# Patient Record
Sex: MALE
[De-identification: names, ages, dates, MRNs, and addresses within clinical notes are randomized per-mention and may not be internally consistent; named-entity substitution may affect disease eponyms.]

---

## 2023-06-28 PROBLEM — Z00.00 ENCOUNTER FOR PREVENTIVE HEALTH EXAMINATION: Status: ACTIVE | Noted: 2023-06-28

## 2023-06-30 ENCOUNTER — NON-APPOINTMENT (OUTPATIENT)
Age: 53
End: 2023-06-30

## 2023-06-30 ENCOUNTER — APPOINTMENT (OUTPATIENT)
Dept: COLORECTAL SURGERY | Facility: CLINIC | Age: 53
End: 2023-06-30
Payer: COMMERCIAL

## 2023-06-30 VITALS
BODY MASS INDEX: 40.43 KG/M2 | HEART RATE: 100 BPM | DIASTOLIC BLOOD PRESSURE: 89 MMHG | WEIGHT: 315 LBS | OXYGEN SATURATION: 96 % | HEIGHT: 74 IN | SYSTOLIC BLOOD PRESSURE: 122 MMHG | TEMPERATURE: 98.8 F

## 2023-06-30 DIAGNOSIS — Z85.118 PERSONAL HISTORY OF OTHER MALIGNANT NEOPLASM OF BRONCHUS AND LUNG: ICD-10-CM

## 2023-06-30 DIAGNOSIS — C18.9 MALIGNANT NEOPLASM OF COLON, UNSPECIFIED: ICD-10-CM

## 2023-06-30 DIAGNOSIS — Z87.891 PERSONAL HISTORY OF NICOTINE DEPENDENCE: ICD-10-CM

## 2023-06-30 PROCEDURE — 99203 OFFICE O/P NEW LOW 30 MIN: CPT | Mod: 25

## 2023-06-30 PROCEDURE — 46600 DIAGNOSTIC ANOSCOPY SPX: CPT

## 2023-06-30 NOTE — REVIEW OF SYSTEMS
[Negative] : Heme/Lymph [Fever] : no fever [Chills] : no chills [Feeling Poorly] : not feeling poorly [Feeling Tired] : not feeling tired [Recent Weight Gain (___ Lbs)] : no recent weight gain [Recent Weight Loss (___ Lbs)] : no recent weight loss [Eye Pain] : no eye pain [Red Eyes] : eyes not red [Earache] : no earache [Loss Of Hearing] : no hearing loss [Nasal Discharge] : no nasal discharge [Nosebleeds] : no nosebleeds [Heart Rate Is Slow] : the heart rate was not slow [Heart Rate Is Fast] : the heart rate was not fast [Shortness Of Breath] : no shortness of breath [Wheezing] : no wheezing [Cough] : no cough [SOB on Exertion] : no shortness of breath during exertion [Abdominal Pain] : no abdominal pain [Vomiting] : no vomiting [Constipation] : no constipation [Diarrhea] : no diarrhea [Heartburn] : no heartburn [Melena] : no melena [Dysuria] : no dysuria [Incontinence] : no incontinence [Hesitancy] : no urinary hesitancy [Nocturia] : no nocturia [Arthralgias] : no arthralgias [Joint Swelling] : no joint swelling [Joint Stiffness] : no joint stiffness [Limb Pain] : no limb pain [Limb Swelling] : no limb swelling [Confused] : no confusion [Convulsions] : no convulsions [Dizziness] : no dizziness [Fainting] : no fainting [Suicidal] : not suicidal [Sleep Disturbances] : no sleep disturbances [Anxiety] : no anxiety [Depression] : no depression [Easy Bleeding] : no tendency for easy bleeding [Easy Bruising] : no tendency for easy bruising

## 2023-06-30 NOTE — ASSESSMENT
[FreeTextEntry1] : 13 years s/p Right mayda for colon cancer, then chemo x 12 cycles (? FOLFOX) and then lung met removal by Dr. Moy in 2013.  Had f/u after (we don’t have the results available to us today) and, as per patient, has been cancer free.\par Has been 5 years since last scope and he has occasional BRBPR.\par Has grade 2 hemorrhoids (internal) that was noted on anoscopy today.  No prolapse noted. No pain.  No fissure symptoms and no fissure seen. \par BMI 60\par Colonoscopy will have to be done in the hospital in the endo suite.\par Need medical and cardiac clearance prior.

## 2023-06-30 NOTE — PHYSICAL EXAM
[Reduce Spontaneously] : a spontaneously reducible (grade II) [Normal] : was normal [None] : there was no rectal mass  [Normal Breath Sounds] : Normal breath sounds [Normal Heart Sounds] : normal heart sounds [2+] : right 2+ [No Rash or Lesion] : No rash or lesion [Alert] : alert [Oriented to Person] : oriented to person [Oriented to Place] : oriented to place [Oriented to Time] : oriented to time [Calm] : calm [Abdomen Masses] : No abdominal masses [Abdomen Tenderness] : ~T No ~M abdominal tenderness [Excoriation] : no perianal excoriation [Fistula] : no fistulas [Wart] : no warts [Ulcer ___ cm] : no ulcers [Tender, Swollen] : nontender, non-swollen [Thrombosed] : that was not thrombosed [Skin Tags] : there were no residual hemorrhoidal skin tags seen [Stool Sample Taken] : no stool obtained on rectal exam [Gross Blood] : no gross blood [JVD] : no jugular venous distention  [Thyroid] : the thyroid was abnormal [Carotid Bruits] : no carotid bruits [de-identified] : morbidly obese, scar in midline healed and without hernia.  no tenderness, no mass appreciated [de-identified] : ext: wNL, digital: tone WNL, no masses.  Anmoscopy: Grade 2 internal hemorrhoids,x 2 quadrants LL and RA.  No fissure. [de-identified] : NAD

## 2023-06-30 NOTE — HISTORY OF PRESENT ILLNESS
[FreeTextEntry1] : Oncologist: Dr. Leonel Cordova\par \par 52 y/o M with Hx. of colon cancer, s/p right hemicolectomy in 2/2011, here for prescreening for colonoscopy. Had 12 sessions of chemo for colon cancer. He had minute lung nodules and being observed with imaging. In 2013 nodules increased in size. Biopsy was done and found to be colon cancer mets. He had 12 sessions of chemo before lung surgery (only nodules removed. No lobectomy).\par \par Last colonoscopy was on 2/28/18. A 7-10 mm scar was found in the mid transverse colon immediately adjacent to the anastomosis. Biopsies were done to r/o neoplasm. Pathology read as colonic mucosa within normal limits, no tumor seen. \par Bowel movements are daily x1 and stool are soft and hard sometimes. Denies need to strain. Denies rectal bleeding and pain with BM. Denies any anal irritation. \par \par